# Patient Record
Sex: FEMALE | URBAN - NONMETROPOLITAN AREA
[De-identification: names, ages, dates, MRNs, and addresses within clinical notes are randomized per-mention and may not be internally consistent; named-entity substitution may affect disease eponyms.]

---

## 2017-04-26 ENCOUNTER — APPOINTMENT (RX ONLY)
Dept: URBAN - NONMETROPOLITAN AREA CLINIC 1 | Facility: CLINIC | Age: 47
Setting detail: DERMATOLOGY
End: 2017-04-26

## 2017-04-26 DIAGNOSIS — Z41.9 ENCOUNTER FOR PROCEDURE FOR PURPOSES OTHER THAN REMEDYING HEALTH STATE, UNSPECIFIED: ICD-10-CM

## 2017-04-26 PROCEDURE — ? BOTOX

## 2017-04-26 NOTE — PROCEDURE: BOTOX
Lot #: [id number]
Additional Area 4 Units: 0
Detail Level: Detailed
Post-Care Instructions: Patient instructed to not lie down for 4 hours and limit physical activity for 24 hours. Patient instructed not to travel by airplane for 48 hours.
Forehead Units: 6
Expiration Date (Month Year): 11/2019
Dilution (U/0.1 Cc): 4
Consent: Written consent obtained. Risks include but not limited to lid/brow ptosis, bruising, swelling, diplopia, temporary effect, incomplete chemical denervation.
Price (Use Numbers Only, No Special Characters Or $): 405
Additional Area 1 Location: Lateral superior obituaries
Glabellar Complex Units: 9
Periorbital Skin Units: 12

## 2017-08-18 ENCOUNTER — APPOINTMENT (RX ONLY)
Dept: URBAN - NONMETROPOLITAN AREA CLINIC 1 | Facility: CLINIC | Age: 47
Setting detail: DERMATOLOGY
End: 2017-08-18

## 2017-08-18 DIAGNOSIS — B00.1 HERPESVIRAL VESICULAR DERMATITIS: ICD-10-CM

## 2017-08-18 DIAGNOSIS — Z41.9 ENCOUNTER FOR PROCEDURE FOR PURPOSES OTHER THAN REMEDYING HEALTH STATE, UNSPECIFIED: ICD-10-CM

## 2017-08-18 DIAGNOSIS — D18.0 HEMANGIOMA: ICD-10-CM

## 2017-08-18 DIAGNOSIS — L81.4 OTHER MELANIN HYPERPIGMENTATION: ICD-10-CM

## 2017-08-18 DIAGNOSIS — D22 MELANOCYTIC NEVI: ICD-10-CM

## 2017-08-18 DIAGNOSIS — L82.1 OTHER SEBORRHEIC KERATOSIS: ICD-10-CM

## 2017-08-18 PROBLEM — D48.5 NEOPLASM OF UNCERTAIN BEHAVIOR OF SKIN: Status: ACTIVE | Noted: 2017-08-18

## 2017-08-18 PROBLEM — D18.01 HEMANGIOMA OF SKIN AND SUBCUTANEOUS TISSUE: Status: ACTIVE | Noted: 2017-08-18

## 2017-08-18 PROBLEM — D22.5 MELANOCYTIC NEVI OF TRUNK: Status: ACTIVE | Noted: 2017-08-18

## 2017-08-18 PROCEDURE — ? OBSERVATION

## 2017-08-18 PROCEDURE — ? BOTOX

## 2017-08-18 PROCEDURE — 99214 OFFICE O/P EST MOD 30 MIN: CPT

## 2017-08-18 PROCEDURE — ? COUNSELING

## 2017-08-18 PROCEDURE — ? PRESCRIPTION

## 2017-08-18 RX ORDER — VALACYCLOVIR 1 G/1
2 TABLET ORAL
Qty: 30 | Refills: 3 | Status: ERX

## 2017-08-18 RX ORDER — VALACYCLOVIR 1 G/1
2 TABLET ORAL
Qty: 1 | Refills: 3 | Status: ERX | COMMUNITY
Start: 2017-08-18

## 2017-08-18 RX ADMIN — VALACYCLOVIR 2: 1 TABLET ORAL at 17:53

## 2017-08-18 ASSESSMENT — LOCATION DETAILED DESCRIPTION DERM
LOCATION DETAILED: RIGHT DISTAL PRETIBIAL REGION
LOCATION DETAILED: LEFT BUTTOCK
LOCATION DETAILED: LEFT DISTAL POSTERIOR UPPER ARM
LOCATION DETAILED: RIGHT MEDIAL SUPERIOR CHEST
LOCATION DETAILED: RIGHT SUPERIOR UPPER BACK
LOCATION DETAILED: LEFT MEDIAL SUPERIOR CHEST
LOCATION DETAILED: RIGHT ANTERIOR DISTAL THIGH
LOCATION DETAILED: UPPER STERNUM
LOCATION DETAILED: PERIUMBILICAL SKIN

## 2017-08-18 ASSESSMENT — LOCATION SIMPLE DESCRIPTION DERM
LOCATION SIMPLE: LEFT POSTERIOR UPPER ARM
LOCATION SIMPLE: CHEST
LOCATION SIMPLE: LEFT BUTTOCK
LOCATION SIMPLE: RIGHT PRETIBIAL REGION
LOCATION SIMPLE: RIGHT UPPER BACK
LOCATION SIMPLE: RIGHT THIGH
LOCATION SIMPLE: ABDOMEN

## 2017-08-18 ASSESSMENT — LOCATION ZONE DERM
LOCATION ZONE: TRUNK
LOCATION ZONE: LEG
LOCATION ZONE: ARM

## 2017-08-18 NOTE — PROCEDURE: OBSERVATION
Morphology Per Location (Optional): irregular dark brown macule
X Size Of Lesion In Cm (Optional): 0.4
Detail Level: Simple
Size Of Lesion In Cm (Optional): 0.5
Body Location Override (Optional - Billing Will Still Be Based On Selected Body Map Location If Applicable): R periumbilical skin
X Size Of Lesion In Cm (Optional): 0.2
Morphology Per Location (Optional): brown macule with darker area upper R quadrant and notch at 12 o'clock and 5 o'clock
Body Location Override (Optional - Billing Will Still Be Based On Selected Body Map Location If Applicable): R upper back
Body Location Override (Optional - Billing Will Still Be Based On Selected Body Map Location If Applicable): L medial distal upper arm
Size Of Lesion In Cm (Optional): 0.9
X Size Of Lesion In Cm (Optional): 0.8
Morphology Per Location (Optional): even brown macule with sharp boarder
Size Of Lesion In Cm (Optional): 0.6
Morphology Per Location (Optional): even brown macule with sharp border and notch at 7 o'clock
Body Location Override (Optional - Billing Will Still Be Based On Selected Body Map Location If Applicable): EMMY adorno
Morphology Per Location (Optional): even brown macule with sharp border
X Size Of Lesion In Cm (Optional): 0.7
Body Location Override (Optional - Billing Will Still Be Based On Selected Body Map Location If Applicable): L ventral upper thigh
Morphology Per Location (Optional): slate blue macule
Body Location Override (Optional - Billing Will Still Be Based On Selected Body Map Location If Applicable): L medial buttock

## 2017-08-18 NOTE — PROCEDURE: BOTOX
Detail Level: Detailed
Additional Area 4 Location: L lateral eyebrow
Additional Area 5 Units: 0
Forehead Units: 6
Additional Area 3 Location: bunny lines
Additional Area 1 Location: Superior lateral obituaries oculi
Consent: Written consent obtained. Risks include but not limited to lid/brow ptosis, bruising, swelling, diplopia, temporary effect, incomplete chemical denervation.
Periorbital Skin Units: 12
Glabellar Complex Units: 9
Additional Area 2 Location: infraorbital
Dilution (U/0.1 Cc): 4
Price (Use Numbers Only, No Special Characters Or $): 405
Expiration Date (Month Year): 3/2020
Lot #: [id number]
Post-Care Instructions: Patient instructed to not lie down for 4 hours and limit physical activity for 24 hours.

## 2017-08-18 NOTE — HPI: INFECTION (HERPES SIMPLEX)
Is This A New Presentation, Or A Follow-Up?: Infection
Additional History: Would like to know what else she can take.  Has taken oral antiviral in the past for prophylactic treatment with no adverse effects.

## 2018-01-16 ENCOUNTER — APPOINTMENT (RX ONLY)
Dept: URBAN - NONMETROPOLITAN AREA CLINIC 1 | Facility: CLINIC | Age: 48
Setting detail: DERMATOLOGY
End: 2018-01-16

## 2018-01-16 DIAGNOSIS — Z41.9 ENCOUNTER FOR PROCEDURE FOR PURPOSES OTHER THAN REMEDYING HEALTH STATE, UNSPECIFIED: ICD-10-CM

## 2018-01-16 PROCEDURE — ? BOTOX

## 2018-01-16 NOTE — PROCEDURE: BOTOX
Dilution (U/0.1 Cc): 3.3
Anterior Platysmal Bands Units: 0
Detail Level: Detailed
Glabellar Complex Units: 9
Post-Care Instructions: Patient instructed to not lie down for 4 hours and limit physical activity for 24 hours. Patient instructed not to travel by airplane for 48 hours.
Price (Use Numbers Only, No Special Characters Or $): 253
Expiration Date (Month Year): 06/2020
Consent: Written consent obtained. Risks include but not limited to lid/brow ptosis, bruising, swelling, diplopia, temporary effect, incomplete chemical denervation.
Forehead Units: 10
Lot #: [id number]

## 2019-03-28 ENCOUNTER — APPOINTMENT (RX ONLY)
Dept: URBAN - METROPOLITAN AREA CLINIC 38 | Facility: CLINIC | Age: 49
Setting detail: DERMATOLOGY
End: 2019-03-28

## 2019-03-28 DIAGNOSIS — Z41.9 ENCOUNTER FOR PROCEDURE FOR PURPOSES OTHER THAN REMEDYING HEALTH STATE, UNSPECIFIED: ICD-10-CM

## 2019-03-28 PROCEDURE — ? BOTOX

## 2019-03-28 NOTE — PROCEDURE: BOTOX
Forehead Units: 10
Depressor Anguli Oris Units: 0
Price (Use Numbers Only, No Special Characters Or $): 510
Detail Level: Detailed
Consent: Written consent obtained. Risks include but not limited to lid/brow ptosis, bruising, swelling, diplopia, temporary effect, incomplete chemical denervation.
Periorbital Skin Units: 12
Dilution (U/0.1 Cc): 4
Post-Care Instructions: Patient instructed to not lie down for 4 hours and limit physical activity for 24 hours. Patient instructed not to travel by airplane for 48 hours.

## 2025-06-20 ENCOUNTER — APPOINTMENT (OUTPATIENT)
Dept: URBAN - NONMETROPOLITAN AREA CLINIC 1 | Facility: CLINIC | Age: 55
Setting detail: DERMATOLOGY
End: 2025-06-20

## 2025-06-20 DIAGNOSIS — Z12.83 ENCOUNTER FOR SCREENING FOR MALIGNANT NEOPLASM OF SKIN: ICD-10-CM

## 2025-06-20 DIAGNOSIS — L82.1 OTHER SEBORRHEIC KERATOSIS: ICD-10-CM | Status: STABLE

## 2025-06-20 DIAGNOSIS — L57.0 ACTINIC KERATOSIS: ICD-10-CM

## 2025-06-20 DIAGNOSIS — D22 MELANOCYTIC NEVI: ICD-10-CM | Status: STABLE

## 2025-06-20 PROBLEM — D48.5 NEOPLASM OF UNCERTAIN BEHAVIOR OF SKIN: Status: ACTIVE | Noted: 2025-06-20

## 2025-06-20 PROBLEM — D22.5 MELANOCYTIC NEVI OF TRUNK: Status: ACTIVE | Noted: 2025-06-20

## 2025-06-20 PROCEDURE — ? OBSERVATION

## 2025-06-20 PROCEDURE — ? LIQUID NITROGEN

## 2025-06-20 PROCEDURE — ? COUNSELING

## 2025-06-20 ASSESSMENT — LOCATION ZONE DERM
LOCATION ZONE: ARM
LOCATION ZONE: FACE
LOCATION ZONE: TRUNK

## 2025-06-20 ASSESSMENT — LOCATION DETAILED DESCRIPTION DERM
LOCATION DETAILED: PERIUMBILICAL SKIN
LOCATION DETAILED: LEFT CENTRAL TEMPLE
LOCATION DETAILED: RIGHT SUPERIOR MEDIAL UPPER BACK
LOCATION DETAILED: LEFT DISTAL POSTERIOR UPPER ARM
LOCATION DETAILED: RIGHT CENTRAL ZYGOMA

## 2025-06-20 ASSESSMENT — LOCATION SIMPLE DESCRIPTION DERM
LOCATION SIMPLE: LEFT POSTERIOR UPPER ARM
LOCATION SIMPLE: ABDOMEN
LOCATION SIMPLE: LEFT TEMPLE
LOCATION SIMPLE: RIGHT UPPER BACK
LOCATION SIMPLE: RIGHT ZYGOMA

## 2025-07-08 ENCOUNTER — APPOINTMENT (OUTPATIENT)
Dept: URBAN - NONMETROPOLITAN AREA CLINIC 1 | Facility: CLINIC | Age: 55
Setting detail: DERMATOLOGY
End: 2025-07-08

## 2025-07-08 DIAGNOSIS — Z41.9 ENCOUNTER FOR PROCEDURE FOR PURPOSES OTHER THAN REMEDYING HEALTH STATE, UNSPECIFIED: ICD-10-CM

## 2025-07-08 PROCEDURE — ? BOTOX

## 2025-07-08 NOTE — PROCEDURE: BOTOX
Additional Area 3 Units: 0
Price (Use Numbers Only, No Special Characters Or $): 170
Additional Area 1 Units: 12
Show Nasal Units: Yes
Comments: Pt asked to animate muscles in the treatment area and musculature was palpated for proper injection placement.
Consent: Written consent reviewed by RN and signed by pt. Risks include but not limited to lid/brow ptosis, bruising, swelling, diplopia, temporary effect, incomplete chemical denervation.   \\nPt's taking blood thinners are counseled on the increased risk of bleeding and bruising at the injection site.
Depressor Anguli Oris Units: 4
Lot #: [id number]
Additional Area 1 Location: [facility]
Detail Level: Detailed
Post-Care Instructions: Patient instructed to not lie down for 4 hours and limit physical activity for 24 hours. RN recommends topical arnica and/or ice if bruising presents.\\n\\nPt instructed to contact the clinic with any questions, concerns, or post treatment complications.\\n\\Marquita pt concerns and questions addressed and pt verbalized understanding.
Incrementing Botox Units: By 0.5 Units